# Patient Record
Sex: FEMALE | Race: WHITE | NOT HISPANIC OR LATINO | Employment: UNEMPLOYED | URBAN - NONMETROPOLITAN AREA
[De-identification: names, ages, dates, MRNs, and addresses within clinical notes are randomized per-mention and may not be internally consistent; named-entity substitution may affect disease eponyms.]

---

## 2017-12-14 ENCOUNTER — OFFICE VISIT (OUTPATIENT)
Dept: MEDICAL GROUP | Facility: PHYSICIAN GROUP | Age: 22
End: 2017-12-14
Payer: COMMERCIAL

## 2017-12-14 VITALS
RESPIRATION RATE: 14 BRPM | WEIGHT: 231.4 LBS | OXYGEN SATURATION: 98 % | DIASTOLIC BLOOD PRESSURE: 82 MMHG | HEART RATE: 82 BPM | HEIGHT: 64 IN | SYSTOLIC BLOOD PRESSURE: 128 MMHG | BODY MASS INDEX: 39.5 KG/M2 | TEMPERATURE: 98 F

## 2017-12-14 DIAGNOSIS — Z30.9 ENCOUNTER FOR CONTRACEPTIVE MANAGEMENT, UNSPECIFIED TYPE: ICD-10-CM

## 2017-12-14 DIAGNOSIS — E28.2 PCO (POLYCYSTIC OVARIES): ICD-10-CM

## 2017-12-14 PROCEDURE — 99203 OFFICE O/P NEW LOW 30 MIN: CPT | Performed by: NURSE PRACTITIONER

## 2017-12-14 RX ORDER — ETONOGESTREL AND ETHINYL ESTRADIOL VAGINAL RING .015; .12 MG/D; MG/D
RING VAGINAL
Qty: 3 EACH | Refills: 0 | Status: SHIPPED | OUTPATIENT
Start: 2017-12-14 | End: 2017-12-20 | Stop reason: SDUPTHER

## 2017-12-14 ASSESSMENT — PATIENT HEALTH QUESTIONNAIRE - PHQ9: CLINICAL INTERPRETATION OF PHQ2 SCORE: 0

## 2017-12-14 NOTE — PROGRESS NOTES
CC: PCOS and contraception management    HISTORY OF THE PRESENT ILLNESS: Patient is a 22 y.o. female. This pleasant patient is here today for PCOS and contraception management and to establish care.      Contraception management  Patient reports using NuvaRing for birth control in the past. She has not used birth control for over a year. Her last menstrual period was 12/5/2018. She reports her periods vary from 3-5 days and also vary in flow. She denies any problems with menstruation. She denies history of DVT and smoking. She reports she gets occasional migraines, approximately 2 a month, without aura. She is requesting to restart NuvaRing, and she is now . She had her  would like to start family planning in a year or 2. She reports her last Pap was a year ago and was normal. We are requesting those records.      PCO (polycystic ovaries)  This is a chronic health problem for patient. She was diagnosed when she was 14 years old by Dr. Son in endocrinology and [place]. She reports that she was taking metformin 1000 mg daily to help with PCO S management up until a year and a half ago. She reports that metformin would cause abdominal pain when she was not eating well.  She is requesting to restart metformin.        Allergies: Patient has no known allergies.    Current Outpatient Prescriptions Ordered in [facility]   Medication Sig Dispense Refill   • ethinyl estradiol-etonogestrel (NUVARING) 0.12-0.015 MG/24HR vaginal ring One into vagina and change every 3 weeks after one week off 3 Each 0   • metformin (GLUCOPHAGE) 500 MG Tab Take 1 Tab by mouth 2 times a day, with meals. 60 Tab 3   • Cholecalciferol (VITAMIN D3) 2000 UNIT CAPS Take 1 Cap by mouth every day. 90 Cap 3     No current Epic-ordered facility-administered medications on file.        Past Medical History:   Diagnosis Date   • Obesity (BMI 30-39.9)    • PCOS (polycystic ovarian syndrome)    • Prediabetes        No past surgical history on  "file.    Social History   Substance Use Topics   • Smoking status: Never Smoker   • Smokeless tobacco: Never Used   • Alcohol use Yes      Comment: rarely       Family History   Problem Relation Age of Onset   • Hypertension Mother        ROS:     - Constitutional: Negative for fever, chills, unexpected weight change, and fatigue/generalized weakness.     - HEENT: Negative for vision changes, hearing changes, ear pain, rhinorrhea, sinus congestion, and sore throat.       - Respiratory: Negative for cough or dyspnea.      - Cardiovascular: Negative for chest pain, palpitations, orthopnea, and bilateral lower extremity edema.     - Gastrointestinal: Negative for heartburn, nausea, vomiting, abdominal pain, hematochezia, melena, diarrhea, constipation.     - Genitourinary: Negative for dysuria, polyuria.    - Musculoskeletal: Negative for myalgias, back pain, and joint pain.     - Skin: Negative for rash, itching, cyanotic skin color change.     - Neurological: Negative for dizziness, tingling, tremors, focal sensory deficit, focal weakness. Positive for migraines without aura.    - Psychiatric/Behavioral: Negative for depression, suicidal/homicidal ideation and memory loss.         .    Exam: Blood pressure 128/82, pulse 82, temperature 36.7 °C (98 °F), resp. rate 14, height 1.626 m (5' 4\"), weight 105 kg (231 lb 6.4 oz), last menstrual period 12/06/2017, SpO2 98 %, not currently breastfeeding. Body mass index is 39.72 kg/m².    General: Pleasant, alert, obese habitus, well developed female in NAD  HEENT: Normocephalic. Eyes conjunctiva clear lids without ptosis, pupils equal and reactive to light, ears normal shape and contour, canals are clear bilaterally, tympanic membranes are pearly gray with good light reflex, oropharynx is without erythema, edema or exudates.   Neck: Supple. Thyroid is not enlarged.  Pulmonary: Clear to ausculation.  Normal effort. No rales, ronchi, or wheezing.  Cardiovascular: Regular rate " and rhythm without murmur. Radial pulses are intact and equal bilaterally.  Abdomen: Soft, nontender, nondistended. Normal bowel sounds. Unable to appreciate liver and spleen due to body habitus.  Neurologic: Grossly nonfocal  Lymph: No cervical or supraclavicular lymph nodes are palpable  Skin: Warm and dry.  No obvious lesions. Hirsutism palpated on face.  Musculoskeletal: Normal gait. No extremity cyanosis, clubbing, or edema.  Psych: Normal mood and affect. Alert and oriented. Judgment and insight is normal.    Please note that this dictation was created using voice recognition software. I have made every reasonable attempt to correct obvious errors, but I expect that there are errors of grammar and possibly content that I did not discover before finalizing the note.      Assessment/Plan  1. PCO (polycystic ovaries)  I reviewed previous provider's notes, which state that patient has PCO and wash using Nuvaring and taking metformin with good control.  Patient will restart metformin.  She will get labs done and will follow up in clinic in 3 months.  - ethinyl estradiol-etonogestrel (NUVARING) 0.12-0.015 MG/24HR vaginal ring; One into vagina and change every 3 weeks after one week off  Dispense: 3 Each; Refill: 0  - metformin (GLUCOPHAGE) 500 MG Tab; Take 1 Tab by mouth 2 times a day, with meals.  Dispense: 60 Tab; Refill: 3  - HEMOGLOBIN A1C; Future  - COMP METABOLIC PANEL; Future  - TSH; Future  - FREE THYROXINE; Future      2. Encounter for contraceptive management, unspecified type  Patient will restart Nuvaring.  She would like to start trying to get pregnant in a year or so.    - ethinyl estradiol-etonogestrel (NUVARING) 0.12-0.015 MG/24HR vaginal ring; One into vagina and change every 3 weeks after one week off  Dispense: 3 Each; Refill: 0    12/15/17 - I did not have patient to pregnancy test yesterday. I will contact patient to have her come in for pregnancy test before starting NuvaRing. I contacted  [facility] pharmacy, who said they received metformin prescription but not the NuvaRing prescription. Patient has not picked up medication yet. I will re-send after we confirm that patient is not pregnant.    12/20/17 - Correction: patient's LMP was 12/5/17

## 2017-12-14 NOTE — ASSESSMENT & PLAN NOTE
Patient reports using NuvaRing for birth control in the past. She has not used birth control for over a year. Her last menstrual period was 12/5/2018. She reports her periods vary from 3-5 days and also vary in flow. She denies any problems with menstruation. She denies history of DVT and smoking. She reports she gets occasional migraines, approximately 2 a month, without aura. She is requesting to restart NuvaRing, and she is now . She had her  would like to start family planning in a year or 2. She reports her last Pap was a year ago and was normal. We are requesting those records.

## 2017-12-15 ENCOUNTER — TELEPHONE (OUTPATIENT)
Dept: MEDICAL GROUP | Facility: PHYSICIAN GROUP | Age: 22
End: 2017-12-15

## 2017-12-15 DIAGNOSIS — Z30.015 ENCOUNTER FOR INITIAL PRESCRIPTION OF VAGINAL RING HORMONAL CONTRACEPTIVE: ICD-10-CM

## 2017-12-15 PROCEDURE — 81025 URINE PREGNANCY TEST: CPT | Performed by: NURSE PRACTITIONER

## 2017-12-15 NOTE — TELEPHONE ENCOUNTER
Message left for Heide to please contact office.  Heide seen by Kayleigh Vieira 12/14/17.  We need to collect sample for POC pregnancy.

## 2017-12-15 NOTE — ASSESSMENT & PLAN NOTE
This is a chronic health problem for patient. She was diagnosed when she was 14 years old by Dr. Son in endocrinology and [place]. She reports that she was taking metformin 1000 mg daily to help with PCO S management up until a year and a half ago. She reports that metformin would cause abdominal pain when she was not eating well.  She is requesting to restart metformin.

## 2017-12-20 ENCOUNTER — NON-PROVIDER VISIT (OUTPATIENT)
Dept: MEDICAL GROUP | Facility: PHYSICIAN GROUP | Age: 22
End: 2017-12-20
Payer: COMMERCIAL

## 2017-12-20 DIAGNOSIS — E28.2 PCO (POLYCYSTIC OVARIES): ICD-10-CM

## 2017-12-20 DIAGNOSIS — Z30.9 ENCOUNTER FOR CONTRACEPTIVE MANAGEMENT, UNSPECIFIED TYPE: ICD-10-CM

## 2017-12-20 LAB
INT CON NEG: NEGATIVE
INT CON POS: POSITIVE
POC URINE PREGNANCY TEST: NORMAL

## 2017-12-20 RX ORDER — ETONOGESTREL AND ETHINYL ESTRADIOL VAGINAL RING .015; .12 MG/D; MG/D
RING VAGINAL
Qty: 3 EACH | Refills: 4 | Status: SHIPPED | OUTPATIENT
Start: 2017-12-20

## 2017-12-20 NOTE — PROGRESS NOTES
POC pregnancy test is negative today.  Metformin prescription has been sent over to [facility] pharmacy in [place]

## 2017-12-21 NOTE — NON-PROVIDER
Heide Turner is a 22 y.o. female here for a non-provider visit for Urine Pregnancy Test.     If abnormal was an in office provider notified today (if so, indicate provider)? Yes  Routed to PCP? Yes

## 2017-12-21 NOTE — PROGRESS NOTES
POC pregnancy test is negative today.  Nuvaring prescription sent into [facility] pharmacy in [place].